# Patient Record
Sex: MALE | Race: WHITE | NOT HISPANIC OR LATINO | URBAN - METROPOLITAN AREA
[De-identification: names, ages, dates, MRNs, and addresses within clinical notes are randomized per-mention and may not be internally consistent; named-entity substitution may affect disease eponyms.]

---

## 2022-09-29 ENCOUNTER — WEB ENCOUNTER (OUTPATIENT)
Dept: URBAN - METROPOLITAN AREA CLINIC 118 | Facility: CLINIC | Age: 52
End: 2022-09-29

## 2022-10-04 ENCOUNTER — OFFICE VISIT (OUTPATIENT)
Dept: URBAN - METROPOLITAN AREA CLINIC 118 | Facility: CLINIC | Age: 52
End: 2022-10-04
Payer: MEDICARE

## 2022-10-04 ENCOUNTER — DASHBOARD ENCOUNTERS (OUTPATIENT)
Age: 52
End: 2022-10-04

## 2022-10-04 ENCOUNTER — LAB OUTSIDE AN ENCOUNTER (OUTPATIENT)
Dept: URBAN - METROPOLITAN AREA CLINIC 118 | Facility: CLINIC | Age: 52
End: 2022-10-04

## 2022-10-04 VITALS
BODY MASS INDEX: 36.55 KG/M2 | TEMPERATURE: 97.1 F | SYSTOLIC BLOOD PRESSURE: 132 MMHG | DIASTOLIC BLOOD PRESSURE: 84 MMHG | WEIGHT: 219.4 LBS | HEIGHT: 65 IN | HEART RATE: 103 BPM

## 2022-10-04 DIAGNOSIS — R13.19 ESOPHAGEAL DYSPHAGIA: ICD-10-CM

## 2022-10-04 DIAGNOSIS — R10.84 GENERALIZED ABDOMINAL PAIN: ICD-10-CM

## 2022-10-04 DIAGNOSIS — Z86.010 PERSONAL HISTORY OF COLONIC POLYPS: ICD-10-CM

## 2022-10-04 DIAGNOSIS — R19.5 LOOSE STOOLS: ICD-10-CM

## 2022-10-04 PROCEDURE — 99204 OFFICE O/P NEW MOD 45 MIN: CPT

## 2022-10-04 RX ORDER — CHOLESTYRAMINE 4 G/9G
1 SCOOP POWDER, FOR SUSPENSION ORAL ONCE A DAY
Qty: 1 | Refills: 2 | OUTPATIENT
Start: 2022-10-04

## 2022-10-04 RX ORDER — DICYCLOMINE HYDROCHLORIDE 10 MG/1
1 TABLET CAPSULE ORAL THREE TIMES A DAY
Qty: 90 | Refills: 2 | OUTPATIENT
Start: 2022-10-04 | End: 2023-01-01

## 2022-10-04 NOTE — PHYSICAL EXAM NECK/THYROID:
normal appearance ,no deformities , trachea midline , Thyroid normal size , no masses, no lymphadenopathy

## 2022-10-04 NOTE — HPI-TODAY'S VISIT:
Mr. Maloney is a 53 y/o WM who presents today for colonoscopy consultation. His last colonoscopy was in 2016 with one 4mm polyp and one 3mm poyp found, he was advised to repeat for surveillance in 5 years. No family history of colon cancer.  Since then, he reports continued abdominal issues. He reports having 6-7 BMs/day, mostly loose. This is an ongoing problem for several years now. He feels like he is constantly in the bathroom. He will at times have to take in the night to have a BM. He reports occasional lower abdominal pain that is relieved with a BM.  Also reports upper abdominal pain that is stabbing in nature, occurs intermittently. Not worsened by eating or moving. He has hx of reflux/heartburn and takes omeprazole daily. He has worsening dysphagia recently, mostly to steak, chicken, hamburger meat etc. He has occasional NV. Denies any GI bleeding including hematemesis, melena or hematochezia. Denies any weight loss. Last EGD was 10+ years ago.

## 2022-10-04 NOTE — PHYSICAL EXAM CHEST:
no lesions,  no masses present, breathing is unlabored without accessory muscle use, clear to auscultation bilaterally

## 2022-10-05 PROBLEM — 428283002: Status: ACTIVE | Noted: 2022-10-04

## 2022-10-24 ENCOUNTER — OFFICE VISIT (OUTPATIENT)
Dept: URBAN - METROPOLITAN AREA SURGERY CENTER 23 | Facility: SURGERY CENTER | Age: 52
End: 2022-10-24

## 2022-10-27 ENCOUNTER — OFFICE VISIT (OUTPATIENT)
Dept: URBAN - METROPOLITAN AREA SURGERY CENTER 23 | Facility: SURGERY CENTER | Age: 52
End: 2022-10-27

## 2022-11-07 ENCOUNTER — OFFICE VISIT (OUTPATIENT)
Dept: URBAN - METROPOLITAN AREA SURGERY CENTER 23 | Facility: SURGERY CENTER | Age: 52
End: 2022-11-07
Payer: MEDICARE

## 2022-11-07 DIAGNOSIS — R13.19 CERVICAL DYSPHAGIA: ICD-10-CM

## 2022-11-07 PROCEDURE — G8907 PT DOC NO EVENTS ON DISCHARG: HCPCS | Performed by: INTERNAL MEDICINE

## 2022-11-07 PROCEDURE — 43249 ESOPH EGD DILATION <30 MM: CPT | Performed by: INTERNAL MEDICINE

## 2022-11-07 RX ORDER — DICYCLOMINE HYDROCHLORIDE 10 MG/1
1 TABLET CAPSULE ORAL THREE TIMES A DAY
Qty: 90 | Refills: 2 | Status: ACTIVE | COMMUNITY
Start: 2022-10-04 | End: 2023-01-01

## 2022-11-07 RX ORDER — CHOLESTYRAMINE 4 G/9G
1 SCOOP POWDER, FOR SUSPENSION ORAL ONCE A DAY
Qty: 1 | Refills: 2 | Status: ACTIVE | COMMUNITY
Start: 2022-10-04

## 2022-12-01 ENCOUNTER — OFFICE VISIT (OUTPATIENT)
Dept: URBAN - METROPOLITAN AREA SURGERY CENTER 23 | Facility: SURGERY CENTER | Age: 52
End: 2022-12-01

## 2023-07-06 ENCOUNTER — ERX REFILL RESPONSE (OUTPATIENT)
Dept: URBAN - METROPOLITAN AREA CLINIC 118 | Facility: CLINIC | Age: 53
End: 2023-07-06

## 2023-07-06 RX ORDER — DICYCLOMINE HYDROCHLORIDE 10 MG/1
TAKE 1 CAPSULE BY MOUTH THREE TIMES DAILY CAPSULE ORAL
Qty: 90 CAPSULE | Refills: 3 | OUTPATIENT

## 2023-07-06 RX ORDER — DICYCLOMINE HYDROCHLORIDE 10 MG/1
TAKE 1 CAPSULE BY MOUTH THREE TIMES DAILY CAPSULE ORAL
Qty: 90 CAPSULE | Refills: 2 | OUTPATIENT